# Patient Record
Sex: FEMALE | Race: WHITE | NOT HISPANIC OR LATINO | Employment: PART TIME | URBAN - METROPOLITAN AREA
[De-identification: names, ages, dates, MRNs, and addresses within clinical notes are randomized per-mention and may not be internally consistent; named-entity substitution may affect disease eponyms.]

---

## 2024-01-02 ENCOUNTER — APPOINTMENT (OUTPATIENT)
Dept: GASTROENTEROLOGY | Facility: HOSPITAL | Age: 39
End: 2024-01-02
Payer: COMMERCIAL

## 2024-01-23 ENCOUNTER — APPOINTMENT (OUTPATIENT)
Dept: GASTROENTEROLOGY | Facility: HOSPITAL | Age: 39
End: 2024-01-23
Payer: COMMERCIAL

## 2024-02-26 ENCOUNTER — HOSPITAL ENCOUNTER (EMERGENCY)
Facility: HOSPITAL | Age: 39
Discharge: HOME | End: 2024-02-26
Payer: COMMERCIAL

## 2024-02-26 VITALS
HEART RATE: 72 BPM | RESPIRATION RATE: 18 BRPM | TEMPERATURE: 97.5 F | OXYGEN SATURATION: 100 % | DIASTOLIC BLOOD PRESSURE: 85 MMHG | SYSTOLIC BLOOD PRESSURE: 142 MMHG

## 2024-02-26 DIAGNOSIS — H60.501 ACUTE OTITIS EXTERNA OF RIGHT EAR, UNSPECIFIED TYPE: Primary | ICD-10-CM

## 2024-02-26 PROCEDURE — 99283 EMERGENCY DEPT VISIT LOW MDM: CPT | Performed by: PHYSICIAN ASSISTANT

## 2024-02-26 PROCEDURE — 99283 EMERGENCY DEPT VISIT LOW MDM: CPT

## 2024-02-26 RX ORDER — CIPROFLOXACIN AND DEXAMETHASONE 3; 1 MG/ML; MG/ML
4 SUSPENSION/ DROPS AURICULAR (OTIC) 2 TIMES DAILY
Qty: 7.5 ML | Refills: 0 | Status: SHIPPED | OUTPATIENT
Start: 2024-02-26

## 2024-02-26 RX ORDER — IBUPROFEN 600 MG/1
600 TABLET ORAL EVERY 6 HOURS PRN
Qty: 28 TABLET | Refills: 0 | Status: SHIPPED | OUTPATIENT
Start: 2024-02-26 | End: 2024-03-04

## 2024-02-26 ASSESSMENT — LIFESTYLE VARIABLES
EVER FELT BAD OR GUILTY ABOUT YOUR DRINKING: NO
HAVE YOU EVER FELT YOU SHOULD CUT DOWN ON YOUR DRINKING: NO
HAVE PEOPLE ANNOYED YOU BY CRITICIZING YOUR DRINKING: NO
EVER HAD A DRINK FIRST THING IN THE MORNING TO STEADY YOUR NERVES TO GET RID OF A HANGOVER: NO

## 2024-02-26 ASSESSMENT — COLUMBIA-SUICIDE SEVERITY RATING SCALE - C-SSRS
2. HAVE YOU ACTUALLY HAD ANY THOUGHTS OF KILLING YOURSELF?: NO
1. IN THE PAST MONTH, HAVE YOU WISHED YOU WERE DEAD OR WISHED YOU COULD GO TO SLEEP AND NOT WAKE UP?: NO
6. HAVE YOU EVER DONE ANYTHING, STARTED TO DO ANYTHING, OR PREPARED TO DO ANYTHING TO END YOUR LIFE?: NO

## 2024-02-26 ASSESSMENT — PAIN SCALES - GENERAL: PAINLEVEL_OUTOF10: 10 - WORST POSSIBLE PAIN

## 2024-02-26 ASSESSMENT — PAIN - FUNCTIONAL ASSESSMENT: PAIN_FUNCTIONAL_ASSESSMENT: 0-10

## 2024-02-26 NOTE — ED TRIAGE NOTES
Patient came to ED with complaint of pain and bleeding from right ear. Patient states she poked her ear with a q-tip 3 days ago and it has been bleeding off and on since.Patient ear is not bleeding in triage. No additional complaints. No medical history.

## 2024-02-26 NOTE — ED PROVIDER NOTES
Emergency Department Encounter  [facility] EMERGENCY MEDICINE    Patient: Odalis Mohamud  MRN: [id number]  : 1985  Date of Evaluation: 2024  ED Provider: Nelida Diamond PA-C      Chief Complaint       Chief Complaint   Patient presents with    Ear Drainage     HPI    Odalis Mohamud is a 38 y.o. female who presents to the emergency department presenting for R ear pain x3 days. Pt reports intermittent bleeding from the right ear only.  Denies any trauma to the ear.  Does note that she has been using Q-tips to her bilateral ears.  Endorses a tight feeling to her right ear, denies any external swelling redness or increased warmth.  Reports slightly decreased hearing to her right ear as compared to her left ear.  Does not take any blood thinners.  No head trauma or associated loss of consciousness.  Denies any changes in vision, nausea or vomiting, dizziness, numbness or tingling, weakness.  No history of diabetes.    ROS:     Review of Systems  14 systems reviewed and otherwise acutely negative except as in the HPI.    Past History   No past medical history on file.  No past surgical history on file.  Social History     Socioeconomic History    Marital status: Single     Spouse name: Not on file    Number of children: Not on file    Years of education: Not on file    Highest education level: Not on file   Occupational History    Not on file   Tobacco Use    Smoking status: Not on file    Smokeless tobacco: Not on file   Substance and Sexual Activity    Alcohol use: Not on file    Drug use: Not on file    Sexual activity: Not on file   Other Topics Concern    Not on file   Social History Narrative    Not on file     Social Determinants of Health     Financial Resource Strain: Not on file   Food Insecurity: Not on file   Transportation Needs: Not on file   Physical Activity: Not on file   Stress: Not on file   Social Connections: Not on file   Intimate Partner Violence: Not on file   Housing  Stability: Not on file       Medications/Allergies     Previous Medications    No medications on file     No Known Allergies     Physical Exam       ED Triage Vitals [02/26/24 1104]   Temperature Heart Rate Respirations BP   36.4 °C (97.5 °F) 72 18 142/85      Pulse Ox Temp src Heart Rate Source Patient Position   100 % -- -- --      BP Location FiO2 (%)     -- --         Physical Exam    Physical Exam:     VS: As documented in the triage note and EMR flowsheet from this visit were reviewed.    Appearance: Alert, oriented, cooperative, in no acute distress. Well nourished & well hydrated.    Skin:  Warm, intact and dry.     ENT: Hearing grossly intact. L external auditory canals patent, tympanic membranes intact with visible landmarks. R EAC erythematous and slightly edematous with large nonbleeding abrasion, TM intact with visible landmarks, no effusion. No pinna or mastoid tenderness. Nares patent, mucus membranes moist. No drooling, dysphagia or trismus. Voice non-muffled.    Neck: Supple, without meningismus. No lymphadenopathy.    Pulmonary: Clear bilaterally with good chest wall excursion. No rales, rhonchi or wheezing. No accessory muscle use or stridor.    Cardiac: Normal S1, S2.    Musculoskeletal: Spontaneously moving all extremities without limitation. Extremities warm and well-perfused, capillary refill less than 2 seconds. Pulses full and equal.    Neurological:  Cranial nerves II through XII are grossly intact.    Diagnostics   Not applicable    ED Course   Visit Vitals  /85   Pulse 72   Temp 36.4 °C (97.5 °F)   Resp 18   SpO2 100%     Medications - No data to display    Medical Decision Making     Diagnoses as of 02/26/24 1130   Acute otitis externa of right ear, unspecified type     R EAC with abrasion - no pinna/mastoid TTP. TM clear without effusion, bulging or erythema. Rx for ciprodex to help with EAC edema and prevent infection due to sizable abrasion. Instructed to discontinue any objects  going into the ear as this is likely the cause of her symptoms and will not allow her current abrasion to heal.      Final Impression      1. Acute otitis externa of right ear, unspecified type          DISPOSITION  Disposition: discharge  Patient condition is: Stable    Comment: Please note this report has been produced using speech recognition software and may contain errors related to that system including errors in grammar, punctuation, and spelling, as well as words and phrases that may be inappropriate.  If there are any questions or concerns please feel free to contact the dictating provider for clarification.    JOS Lenz PA-C  02/26/24 113

## 2024-05-14 ENCOUNTER — HOSPITAL ENCOUNTER (EMERGENCY)
Facility: HOSPITAL | Age: 39
Discharge: LEFT BEFORE DISCHARGE | End: 2024-05-14
Payer: COMMERCIAL

## 2024-05-14 VITALS
BODY MASS INDEX: 20.09 KG/M2 | HEART RATE: 82 BPM | WEIGHT: 125 LBS | SYSTOLIC BLOOD PRESSURE: 111 MMHG | TEMPERATURE: 97.2 F | DIASTOLIC BLOOD PRESSURE: 65 MMHG | HEIGHT: 66 IN | OXYGEN SATURATION: 98 % | RESPIRATION RATE: 16 BRPM

## 2024-05-14 PROCEDURE — 4500999001 HC ED NO CHARGE

## 2024-05-14 ASSESSMENT — PAIN - FUNCTIONAL ASSESSMENT: PAIN_FUNCTIONAL_ASSESSMENT: 0-10

## 2024-05-14 ASSESSMENT — COLUMBIA-SUICIDE SEVERITY RATING SCALE - C-SSRS
6. HAVE YOU EVER DONE ANYTHING, STARTED TO DO ANYTHING, OR PREPARED TO DO ANYTHING TO END YOUR LIFE?: NO
2. HAVE YOU ACTUALLY HAD ANY THOUGHTS OF KILLING YOURSELF?: NO
1. IN THE PAST MONTH, HAVE YOU WISHED YOU WERE DEAD OR WISHED YOU COULD GO TO SLEEP AND NOT WAKE UP?: NO

## 2024-05-14 ASSESSMENT — PAIN SCALES - GENERAL: PAINLEVEL_OUTOF10: 7

## 2024-05-14 ASSESSMENT — PAIN DESCRIPTION - PAIN TYPE: TYPE: ACUTE PAIN

## 2024-05-14 ASSESSMENT — PAIN DESCRIPTION - LOCATION: LOCATION: TEETH

## 2024-06-14 ENCOUNTER — HOSPITAL ENCOUNTER (OUTPATIENT)
Dept: RADIOLOGY | Facility: HOSPITAL | Age: 39
Discharge: HOME | End: 2024-06-14
Payer: MEDICAID

## 2024-06-14 VITALS — BODY MASS INDEX: 19.29 KG/M2 | HEIGHT: 66 IN | WEIGHT: 120 LBS

## 2024-06-14 DIAGNOSIS — Z12.31 ENCOUNTER FOR SCREENING MAMMOGRAM FOR MALIGNANT NEOPLASM OF BREAST: ICD-10-CM

## 2024-06-14 PROCEDURE — 77063 BREAST TOMOSYNTHESIS BI: CPT

## 2024-08-18 ENCOUNTER — HOSPITAL ENCOUNTER (EMERGENCY)
Facility: HOSPITAL | Age: 39
Discharge: HOME | End: 2024-08-18
Payer: MEDICAID

## 2024-08-18 VITALS
SYSTOLIC BLOOD PRESSURE: 133 MMHG | RESPIRATION RATE: 18 BRPM | DIASTOLIC BLOOD PRESSURE: 94 MMHG | BODY MASS INDEX: 19.37 KG/M2 | WEIGHT: 120 LBS | HEART RATE: 101 BPM | OXYGEN SATURATION: 97 % | TEMPERATURE: 97.3 F

## 2024-08-18 DIAGNOSIS — T85.848A DENTAL IMPLANT PAIN, INITIAL ENCOUNTER: Primary | ICD-10-CM

## 2024-08-18 PROCEDURE — 99283 EMERGENCY DEPT VISIT LOW MDM: CPT

## 2024-08-18 PROCEDURE — 2500000001 HC RX 250 WO HCPCS SELF ADMINISTERED DRUGS (ALT 637 FOR MEDICARE OP): Mod: SE | Performed by: EMERGENCY MEDICINE

## 2024-08-18 PROCEDURE — 99284 EMERGENCY DEPT VISIT MOD MDM: CPT | Performed by: PHYSICIAN ASSISTANT

## 2024-08-18 RX ORDER — LIDOCAINE HYDROCHLORIDE 20 MG/ML
1.25 SOLUTION OROPHARYNGEAL ONCE
Status: COMPLETED | OUTPATIENT
Start: 2024-08-18 | End: 2024-08-18

## 2024-08-18 ASSESSMENT — COLUMBIA-SUICIDE SEVERITY RATING SCALE - C-SSRS
1. IN THE PAST MONTH, HAVE YOU WISHED YOU WERE DEAD OR WISHED YOU COULD GO TO SLEEP AND NOT WAKE UP?: NO
6. HAVE YOU EVER DONE ANYTHING, STARTED TO DO ANYTHING, OR PREPARED TO DO ANYTHING TO END YOUR LIFE?: NO
2. HAVE YOU ACTUALLY HAD ANY THOUGHTS OF KILLING YOURSELF?: NO

## 2024-08-18 NOTE — CONSULTS
Reason For Consult  Paged by Emergency department for the removal of sticking veneers    History Of Present Illness  Odalis Mohamud is a 38 y.o. female presenting with at seen on TV instant teeth veneers. Patient has missing anterior teeth and pain from the veneers cutting her ridges.     Past Medical History  She has no past medical history on file.    Surgical History  She has no past surgical history on file.     Social History  She has no history on file for tobacco use, alcohol use, and drug use.    Family History  No family history on file.     Allergies  Patient has no known allergies.    Review of Systems  See previous provider's notes     Physical Exam  Patient has placed as seen on TV at home veneers on the anterior maxillary teeth. They are currently stuck and cut into the patient's maxillary ridge. Patient is in  pain and anxious.     Last Recorded Vitals  Blood pressure (!) 133/94, pulse (!) 101, temperature 36.3 °C (97.3 °F), resp. rate 18, weight 54.4 kg (120 lb), SpO2 97%.    Relevant Results  See Physical Exam notes     Assessment/Plan     1.5 carpule of lidocaine with 1:100,000 epi was used to anesthetize the patient.  Provider used , large spoon and hemostat to remove as seen on TV veneers. Patient bit into cause to reduce bleeding. Patient was instructed to take 600 mg of ibuprofen to reduce pain. Patient should see outpatient dentist for overall dental treatment.    I spent 75 minutes in the professional and overall care of this patient.      Tiana Villela DDS

## 2024-08-18 NOTE — DISCHARGE INSTRUCTIONS
Follow-up with your dentist, you can follow-up with the dental clinic in [facility] at [phone]

## 2024-08-18 NOTE — ED PROVIDER NOTES
"Emergency Department Provider Note        History of Present Illness     38-year-old female presenting for oral pain.  States she has many teeth missing due to poor dentition chronically over the past several years or decades.  Applied a home veneer \"as seen on TV\" brand.  Was activated with hot water, she reported in boiling water and then placed in her mouth.  States that after that she tried to remove it however it would not remove easily.  Tried hot water however still not removed.  Tore off pieces of it and still to no avail.  Therefore presented to ED for evaluation.  States it is painful and now cutting into her upper gums.    External Records Reviewed including ED notes, H&P, Discharge Summary, outpatient PCP/specialist notes.  Physical Exam     Triage Vitals: T 36.3 °C (97.3 °F)  HR (!) 101  BP (!) 133/94  RR 18  O2 97 %    GEN: NAD  EYES:  EOMs grossly intact, anicteric sclera  CHRISTIN: Mucosa moist.  Torn up rubbery/plastic implant over her top teeth  NECK: Supple.  CARD: RRR  PULMONARY: Moving air well. Clear all lung fields.  ABDOMEN: Soft, no guarding, no rigidity. Nontender. NABS  EXTREMITIES: Full ROM, no pitting edema,   SKIN: Intact, warm and dry  NEURO: Alert and oriented x 3, speech is clear, no obvious deficits noted.         Medical Decision Making & ED Course     38-year-old female presenting for pain from self implanted veneers.  On exam she is well-appearing ambulating [place].  Vital signs stable.  Does have the obvious rubber/plastic implant over her top teeth.  She was given warm water from the sink to swish and spit, viscous lidocaine, all to no avail with hemostats.  Dentistry was called who was able to extract with wire cutters and local anesthesia.  She will follow-up with dentist.  Return precautions reviewed.    Diagnoses as of 08/18/24 1552   Dental implant pain, initial encounter     No orders to display     Labs Reviewed - No data to " display    ----------------------------------------------------------------------------------------------------------------------------    This note was dictated using a speech recognition program.  While an attempt was made at proof reading to minimize errors, minor errors in transcription may be present call for questions.     Ignacio García PA-C  08/18/24 2140

## 2024-08-18 NOTE — ED TRIAGE NOTES
"Pt states she attempted to use an \"as seen on TV\" faux teeth and now she cannot get them off. pt states she tried pulling them off, cutting them off. Now the rigid edges are cutting the inside of her mouth.   "